# Patient Record
Sex: MALE | Race: BLACK OR AFRICAN AMERICAN | NOT HISPANIC OR LATINO | URBAN - METROPOLITAN AREA
[De-identification: names, ages, dates, MRNs, and addresses within clinical notes are randomized per-mention and may not be internally consistent; named-entity substitution may affect disease eponyms.]

---
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
## 2025-03-14 NOTE — ED PROVIDER NOTE - TOBACCO USE
T(C): 38.2 (03-14-25 @ 15:56), Max: 38.2 (03-14-25 @ 15:56)  HR: 96 (03-14-25 @ 15:56) (96 - 96)  BP: 130/72 (03-14-25 @ 15:56) (130/72 - 130/72)  RR: 18 (03-14-25 @ 15:56) (18 - 18)  SpO2: 98% (03-14-25 @ 15:56) (98% - 98%)  Wt(kg): --Vital Signs Last 24 Hrs  T(C): 38.2 (14 Mar 2025 15:56), Max: 38.2 (14 Mar 2025 15:56)  T(F): 100.8 (14 Mar 2025 15:56), Max: 100.8 (14 Mar 2025 15:56)  HR: 96 (14 Mar 2025 15:56) (96 - 96)  BP: 130/72 (14 Mar 2025 15:56) (130/72 - 130/72)  BP(mean): --  RR: 18 (14 Mar 2025 15:56) (18 - 18)  SpO2: 98% (14 Mar 2025 15:56) (98% - 98%)    Parameters below as of 14 Mar 2025 15:56  Patient On (Oxygen Delivery Method): room air        PHYSICAL EXAM:  GENERAL: NAD, well-groomed, well-developed  HEAD:  Atraumatic, Normocephalic  EYES: EOMI, PERRLA, conjunctiva and sclera clear  ENMT: No tonsillar erythema, exudates, or enlargement; Moist mucous membranes, Good dentition, No lesions  NECK: Supple, No JVD, Normal thyroid  NERVOUS SYSTEM:  Alert & Oriented X3, Good concentration; Motor Strength 5/5 B/L upper and lower extremities; DTRs 2+ intact and symmetric  CHEST/LUNG: Clear to percussion bilaterally; No rales, rhonchi, wheezing, or rubs  HEART: Regular rate and rhythm; No murmurs, rubs, or gallops  ABDOMEN: Soft, Nontender, Nondistended; Bowel sounds present  EXTREMITIES:   No clubbing, cyanosis, or leg edema. +right lower arm with erythema and edema (from above the wrist to elbow) with streaking. very tender to touch
Never smoker

## 2025-08-04 NOTE — ED ADULT TRIAGE NOTE - HEART RATE (BEATS/MIN)
LM with patient; Rx has been called in for him.      Done.    Patient presents today for annual physical exam.  Preventative screenings and anticipatory guidance regarding preventative care discussed.  Depression screening is negative at today's visit.  Will update PSA for prostate cancer screening.  Patient is up-to-date on colon cancer screening.  discussed COVID-19 vaccination booster which he may obtain at the pharmacy.  He will reach out to his insurance to see if shingles vaccination is covered.  Recommend follow-up in 1 year or sooner as needed.   68